# Patient Record
Sex: MALE | Race: OTHER | HISPANIC OR LATINO | URBAN - METROPOLITAN AREA
[De-identification: names, ages, dates, MRNs, and addresses within clinical notes are randomized per-mention and may not be internally consistent; named-entity substitution may affect disease eponyms.]

---

## 2020-02-08 ENCOUNTER — INPATIENT (INPATIENT)
Facility: HOSPITAL | Age: 30
LOS: 1 days | Discharge: HOME | DRG: 176 | End: 2020-02-10
Attending: STUDENT IN AN ORGANIZED HEALTH CARE EDUCATION/TRAINING PROGRAM | Admitting: INTERNAL MEDICINE
Payer: MEDICAID

## 2020-02-08 VITALS
HEIGHT: 65 IN | WEIGHT: 300.05 LBS | DIASTOLIC BLOOD PRESSURE: 63 MMHG | SYSTOLIC BLOOD PRESSURE: 102 MMHG | HEART RATE: 100 BPM | RESPIRATION RATE: 18 BRPM | OXYGEN SATURATION: 97 % | TEMPERATURE: 99 F

## 2020-02-08 DIAGNOSIS — E66.9 OBESITY, UNSPECIFIED: ICD-10-CM

## 2020-02-08 DIAGNOSIS — I26.99 OTHER PULMONARY EMBOLISM WITHOUT ACUTE COR PULMONALE: ICD-10-CM

## 2020-02-08 DIAGNOSIS — R22.1 LOCALIZED SWELLING, MASS AND LUMP, NECK: ICD-10-CM

## 2020-02-08 DIAGNOSIS — R63.8 OTHER SYMPTOMS AND SIGNS CONCERNING FOOD AND FLUID INTAKE: ICD-10-CM

## 2020-02-08 DIAGNOSIS — G47.33 OBSTRUCTIVE SLEEP APNEA (ADULT) (PEDIATRIC): ICD-10-CM

## 2020-02-08 DIAGNOSIS — Z91.89 OTHER SPECIFIED PERSONAL RISK FACTORS, NOT ELSEWHERE CLASSIFIED: ICD-10-CM

## 2020-02-08 DIAGNOSIS — R09.89 OTHER SPECIFIED SYMPTOMS AND SIGNS INVOLVING THE CIRCULATORY AND RESPIRATORY SYSTEMS: ICD-10-CM

## 2020-02-08 LAB
ALBUMIN SERPL ELPH-MCNC: 3.3 G/DL — LOW (ref 3.4–5)
ALP SERPL-CCNC: 63 U/L — SIGNIFICANT CHANGE UP (ref 40–120)
ALT FLD-CCNC: 41 U/L — SIGNIFICANT CHANGE UP (ref 12–42)
ANION GAP SERPL CALC-SCNC: 8 MMOL/L — LOW (ref 9–16)
APPEARANCE UR: CLEAR — SIGNIFICANT CHANGE UP
APTT BLD: 33.3 SEC — SIGNIFICANT CHANGE UP (ref 27.5–36.3)
APTT BLD: >200 SEC — CRITICAL HIGH (ref 27.5–36.3)
AST SERPL-CCNC: 23 U/L — SIGNIFICANT CHANGE UP (ref 15–37)
BILIRUB SERPL-MCNC: 0.3 MG/DL — SIGNIFICANT CHANGE UP (ref 0.2–1.2)
BILIRUB UR-MCNC: NEGATIVE — SIGNIFICANT CHANGE UP
BUN SERPL-MCNC: 12 MG/DL — SIGNIFICANT CHANGE UP (ref 7–23)
CALCIUM SERPL-MCNC: 8.5 MG/DL — SIGNIFICANT CHANGE UP (ref 8.5–10.5)
CHLORIDE SERPL-SCNC: 100 MMOL/L — SIGNIFICANT CHANGE UP (ref 96–108)
CK SERPL-CCNC: 560 U/L — HIGH (ref 39–308)
CO2 SERPL-SCNC: 29 MMOL/L — SIGNIFICANT CHANGE UP (ref 22–31)
COLOR SPEC: YELLOW — SIGNIFICANT CHANGE UP
CREAT SERPL-MCNC: 1.11 MG/DL — SIGNIFICANT CHANGE UP (ref 0.5–1.3)
D DIMER BLD IA.RAPID-MCNC: 669 NG/ML DDU — HIGH
DIFF PNL FLD: NEGATIVE — SIGNIFICANT CHANGE UP
ETHANOL SERPL-MCNC: <3 MG/DL — SIGNIFICANT CHANGE UP
GLUCOSE SERPL-MCNC: 105 MG/DL — HIGH (ref 70–99)
GLUCOSE UR QL: NEGATIVE — SIGNIFICANT CHANGE UP
HCT VFR BLD CALC: 39.5 % — SIGNIFICANT CHANGE UP (ref 39–50)
HGB BLD-MCNC: 13.1 G/DL — SIGNIFICANT CHANGE UP (ref 13–17)
INR BLD: 1.07 — SIGNIFICANT CHANGE UP (ref 0.88–1.16)
KETONES UR-MCNC: NEGATIVE — SIGNIFICANT CHANGE UP
LEUKOCYTE ESTERASE UR-ACNC: NEGATIVE — SIGNIFICANT CHANGE UP
MCHC RBC-ENTMCNC: 28.1 PG — SIGNIFICANT CHANGE UP (ref 27–34)
MCHC RBC-ENTMCNC: 33.2 GM/DL — SIGNIFICANT CHANGE UP (ref 32–36)
MCV RBC AUTO: 84.6 FL — SIGNIFICANT CHANGE UP (ref 80–100)
NITRITE UR-MCNC: NEGATIVE — SIGNIFICANT CHANGE UP
NRBC # BLD: 0 /100 WBCS — SIGNIFICANT CHANGE UP (ref 0–0)
NT-PROBNP SERPL-SCNC: 16 PG/ML — SIGNIFICANT CHANGE UP
NT-PROBNP SERPL-SCNC: 17 PG/ML — SIGNIFICANT CHANGE UP
PCP SPEC-MCNC: SIGNIFICANT CHANGE UP
PH UR: 5.5 — SIGNIFICANT CHANGE UP (ref 5–8)
PLATELET # BLD AUTO: 187 K/UL — SIGNIFICANT CHANGE UP (ref 150–400)
POTASSIUM SERPL-MCNC: 4.3 MMOL/L — SIGNIFICANT CHANGE UP (ref 3.5–5.3)
POTASSIUM SERPL-SCNC: 4.3 MMOL/L — SIGNIFICANT CHANGE UP (ref 3.5–5.3)
PROT SERPL-MCNC: 8.4 G/DL — HIGH (ref 6.4–8.2)
PROT UR-MCNC: NEGATIVE MG/DL — SIGNIFICANT CHANGE UP
PROTHROM AB SERPL-ACNC: 11.9 SEC — SIGNIFICANT CHANGE UP (ref 10–12.9)
RBC # BLD: 4.67 M/UL — SIGNIFICANT CHANGE UP (ref 4.2–5.8)
RBC # FLD: 15.9 % — HIGH (ref 10.3–14.5)
SODIUM SERPL-SCNC: 137 MMOL/L — SIGNIFICANT CHANGE UP (ref 132–145)
SP GR SPEC: 1.02 — SIGNIFICANT CHANGE UP (ref 1–1.03)
TROPONIN I SERPL-MCNC: <0.017 NG/ML — LOW (ref 0.02–0.06)
UROBILINOGEN FLD QL: 0.2 E.U./DL — SIGNIFICANT CHANGE UP
WBC # BLD: 9.02 K/UL — SIGNIFICANT CHANGE UP (ref 3.8–10.5)
WBC # FLD AUTO: 9.02 K/UL — SIGNIFICANT CHANGE UP (ref 3.8–10.5)

## 2020-02-08 PROCEDURE — 99291 CRITICAL CARE FIRST HOUR: CPT

## 2020-02-08 PROCEDURE — 71275 CT ANGIOGRAPHY CHEST: CPT | Mod: 26

## 2020-02-08 PROCEDURE — 71046 X-RAY EXAM CHEST 2 VIEWS: CPT | Mod: 26

## 2020-02-08 RX ORDER — HEPARIN SODIUM 5000 [USP'U]/ML
10000 INJECTION INTRAVENOUS; SUBCUTANEOUS ONCE
Refills: 0 | Status: COMPLETED | OUTPATIENT
Start: 2020-02-08 | End: 2020-02-08

## 2020-02-08 RX ORDER — HEPARIN SODIUM 5000 [USP'U]/ML
INJECTION INTRAVENOUS; SUBCUTANEOUS
Qty: 25000 | Refills: 0 | Status: DISCONTINUED | OUTPATIENT
Start: 2020-02-08 | End: 2020-02-09

## 2020-02-08 RX ORDER — HEPARIN SODIUM 5000 [USP'U]/ML
5000 INJECTION INTRAVENOUS; SUBCUTANEOUS EVERY 6 HOURS
Refills: 0 | Status: DISCONTINUED | OUTPATIENT
Start: 2020-02-08 | End: 2020-02-09

## 2020-02-08 RX ORDER — INFLUENZA VIRUS VACCINE 15; 15; 15; 15 UG/.5ML; UG/.5ML; UG/.5ML; UG/.5ML
0.5 SUSPENSION INTRAMUSCULAR ONCE
Refills: 0 | Status: DISCONTINUED | OUTPATIENT
Start: 2020-02-08 | End: 2020-02-10

## 2020-02-08 RX ORDER — HEPARIN SODIUM 5000 [USP'U]/ML
10000 INJECTION INTRAVENOUS; SUBCUTANEOUS EVERY 6 HOURS
Refills: 0 | Status: DISCONTINUED | OUTPATIENT
Start: 2020-02-08 | End: 2020-02-09

## 2020-02-08 RX ORDER — ASPIRIN/CALCIUM CARB/MAGNESIUM 324 MG
325 TABLET ORAL ONCE
Refills: 0 | Status: COMPLETED | OUTPATIENT
Start: 2020-02-08 | End: 2020-02-08

## 2020-02-08 RX ORDER — SODIUM CHLORIDE 9 MG/ML
1000 INJECTION INTRAMUSCULAR; INTRAVENOUS; SUBCUTANEOUS ONCE
Refills: 0 | Status: COMPLETED | OUTPATIENT
Start: 2020-02-08 | End: 2020-02-08

## 2020-02-08 RX ADMIN — HEPARIN SODIUM 2400 UNIT(S)/HR: 5000 INJECTION INTRAVENOUS; SUBCUTANEOUS at 17:57

## 2020-02-08 RX ADMIN — HEPARIN SODIUM 10000 UNIT(S): 5000 INJECTION INTRAVENOUS; SUBCUTANEOUS at 17:57

## 2020-02-08 RX ADMIN — Medication 325 MILLIGRAM(S): at 15:54

## 2020-02-08 RX ADMIN — SODIUM CHLORIDE 1000 MILLILITER(S): 9 INJECTION INTRAMUSCULAR; INTRAVENOUS; SUBCUTANEOUS at 16:39

## 2020-02-08 NOTE — H&P ADULT - HISTORY OF PRESENT ILLNESS
Patient is a 30yo M with pmh of sleep apnea who presented to [facility] complaining of sharp chest pain of one week duration. Patient states that for the past week he has developed a cramping type of pain in his left upper chest, which radiated to his left neck and down his left arm. He states that for the past week, he has also felt short of breath when walking quickly or walking up the stairs. He denies any lower extremity pain, but reports that about a month ago, his left leg was swollen but the swelling subsided after he moved from sleeping on the couch to sleeping in a bed. Otherwise, patient denies any recent fevers/chills, palpitations, cough, rhinorrhea/nasal congestion, sore throat, abdominal pain. Of note, patient recently stopped smoking two weeks ago. He denies any recent travel, lower extremity trauma, immobilization, or family history of blood clots.    In the ED, T 98.6F, /63, , RR 18 satting at 97% on room air. Labs remarkable for d-dimer of 669. Troponin negative. EKG with NSR. CT angio showing bilateral pulmonary emboli, possible early infarcts in right lower lobe and left upper lobe, and right heart strain. In ED, given 325mg aspirin, 1L NS, 10,000 units heparin and subsequently started on a heparin drip.

## 2020-02-08 NOTE — ED PROVIDER NOTE - CARE PLAN
Principal Discharge DX:	Acute pulmonary embolism, unspecified pulmonary embolism type, unspecified whether acute cor pulmonale present

## 2020-02-08 NOTE — ED PROVIDER NOTE - CRITICAL CARE PROVIDED
interpretation of diagnostic studies/consultation with other physicians/additional history taking/direct patient care (not related to procedure)

## 2020-02-08 NOTE — H&P ADULT - PROBLEM SELECTOR PLAN 1
Patient presenting with sharp chest pain, radiating to left arm and left side of neck, and dyspnea on exertion for one week duration. Found to have b/l pulmonary emboli on CT angio with evidence of right heart strain and possible early infarcts in right lower lobe and left upper lobe  -Patient currently Patient presenting with sharp chest pain, radiating to left arm and left side of neck, and dyspnea on exertion for one week duration. Found to have b/l pulmonary emboli on CT angio with evidence of right heart strain and possible early infarcts in right lower lobe and left upper lobe  -Patient currently clinically stable- VSS, patient appears comfortable on room air, EKG NSR  -Continue heparin gtt  -Echocardiogram tomorrow  -Monitor o2 status  -Patient without any risk factors/ inciting factors (no recent travel, immobilization, trauma, signs of malignancy, family history of blood clots)

## 2020-02-08 NOTE — H&P ADULT - PROBLEM SELECTOR PLAN 3
Patient with reported left sided neck pain for one week duration with left supraclavicular Patient with reported left sided neck pain for one week duration with left supraclavicular swelling, does not appear to be lymphadenopathy  -F/u US of left upper extremity/neck

## 2020-02-08 NOTE — H&P ADULT - ASSESSMENT
Patient is a 30yo M with pmh of sleep apnea who presented to [facility] complaining of sharp chest pain and increasing sob for one week duration, found to have bilateral pulmonary emboli on CT angio.

## 2020-02-08 NOTE — H&P ADULT - NSHPPHYSICALEXAM_GEN_ALL_CORE
.  VITAL SIGNS:  T(C): 36.9 (02-08-20 @ 19:21), Max: 37.7 (02-08-20 @ 15:39)  T(F): 98.5 (02-08-20 @ 19:21), Max: 99.8 (02-08-20 @ 15:39)  HR: 91 (02-08-20 @ 20:12) (88 - 100)  BP: 127/78 (02-08-20 @ 20:12) (102/63 - 151/89)  BP(mean): 98 (02-08-20 @ 20:12) (98 - 112)  RR: 15 (02-08-20 @ 20:12) (14 - 20)  SpO2: 97% (02-08-20 @ 20:12) (94% - 98%)  Wt(kg): --    PHYSICAL EXAM:    Constitutional: Obese male resting comfortably in bed; NAD  Head: NC/AT  Eyes: PERRL, EOMI, anicteric sclera  ENT: no nasal discharge; uvula midline, no oropharyngeal erythema or exudates; MMM  Neck: supple; no JVD or thyromegaly. Left supraclavicular swelling, tender to palpation  Respiratory: CTA B/L; no W/R/R, no retractions  Cardiac: +S1/S2; RRR; no M/R/G; PMI non-displaced  Gastrointestinal: abdomen soft, NT, distended due to body habitus; no rebound or guarding; +BSx4  Back: spine midline, no bony tenderness or step-offs; no CVAT B/L  Extremities: WWP, no clubbing or cyanosis; no peripheral edema  Musculoskeletal: NROM x4; no joint swelling, tenderness or erythema  Vascular: 2+ radial, femoral, DP/PT pulses B/L  Dermatologic: skin warm, dry and intact; no rashes, wounds, or scars  Lymphatic: no submandibular or cervical LAD  Neurologic: AAOx3; CNII-XII grossly intact; no focal deficits  Psychiatric: affect and characteristics of appearance, verbalizations, behaviors are appropriate

## 2020-02-08 NOTE — H&P ADULT - NSHPSOCIALHISTORY_GEN_ALL_CORE
Patient lives at home with wife and child. Not currently working. States he smoked about half a pack of cigarettes per day for about 5 years. Very rare social alcohol use. Smokes about one pound of marijuana daily, no other recreational drug use.

## 2020-02-08 NOTE — CONSULT NOTE ADULT - SUBJECTIVE AND OBJECTIVE BOX
PULMONARY SERVICE INITIAL CONSULT NOTE    HPI:  Patient is a 28yo M with pmh of sleep apnea who presented to [facility] complaining of sharp chest pain of one week duration. Patient states that for the past week he has developed a cramping type of pain in his left upper chest, which radiated to his left neck and down his left arm. He states that for the past week, he has also felt short of breath when walking quickly or walking up the stairs. He denies any lower extremity pain, but reports that about a month ago, his left leg was swollen but the swelling subsided after he moved from sleeping on the couch to sleeping in a bed. Otherwise, patient denies any recent fevers/chills, palpitations, cough, rhinorrhea/nasal congestion, sore throat, abdominal pain. Of note, patient recently stopped smoking two weeks ago. He denies any recent travel, lower extremity trauma, immobilization, or family history of blood clots.    In the ED, T 98.6F, /63, , RR 18 satting at 97% on room air. Labs remarkable for d-dimer of 669. Troponin negative. EKG with NSR. CT angio showing bilateral pulmonary emboli, possible early infarcts in right lower lobe and left upper lobe, and right heart strain. In ED, given 325mg aspirin, 1L NS, 10,000 units heparin and subsequently started on a heparin drip. (2020 21:26)      REVIEW OF SYSTEMS:  Constitutional: No fever, weight loss or fatigue  Eyes: No eye pain, visual disturbances, or discharge  ENMT:  No difficulty hearing, tinnitus, vertigo; No sinus or throat pain  Neck: No pain, stiffness or neck swelling  Respiratory: see HPI  Cardiovascular: No chest pain, palpitations, dizziness or leg swelling  Gastrointestinal: No abdominal or epigastric pain. No nausea, vomiting or hematemesis; No diarrhea or constipation. No melena or hematochezia.  Genitourinary: No dysuria, frequency, hematuria or incontinence  Neurological: No headaches, memory loss, loss of strength, numbness or tremors  Skin: No itching, burning, rashes or lesions   Lymph Nodes: No enlarged glands  Endocrine: No heat or cold intolerance; No hair loss  Musculoskeletal: No joint pain or swelling; No muscle, back or extremity pain  Psychiatric: No depression, anxiety, mood swings or difficulty sleeping  Heme/Lymph: No easy bruising or bleeding gums  Allergy and Immunologic: No hives or eczema    PAST MEDICAL & SURGICAL HISTORY:  Obstructive sleep apnea      FAMILY HISTORY:  FH: type 2 diabetes      SOCIAL HISTORY:  Smoking Status: [X ] Current, [ ] Former, [ ] Never  Pack Years: 15    MEDICATIONS:  Pulmonary:    Antimicrobials:    Anticoagulants:  heparin  Infusion.  Unit(s)/Hr IV Continuous <Continuous>  heparin  Injectable 70174 Unit(s) IV Push every 6 hours PRN  heparin  Injectable 5000 Unit(s) IV Push every 6 hours PRN    Onc:    GI/:    Endocrine:    Cardiac:    Other Medications:  influenza   Vaccine 0.5 milliLiter(s) IntraMuscular once      Allergies    penicillin (Hives)    Intolerances        Vital Signs Last 24 Hrs  T(C): 36.9 (2020 19:21), Max: 37.7 (2020 15:39)  T(F): 98.5 (2020 19:21), Max: 99.8 (2020 15:39)  HR: 91 (2020 20:12) (88 - 100)  BP: 127/78 (2020 20:12) (102/63 - 151/89)  BP(mean): 98 (2020 20:12) (98 - 112)  RR: 15 (2020 20:12) (14 - 20)  SpO2: 97% (2020 20:12) (94% - 98%)        PHYSICAL EXAM:  Constitutional: Obese, not in distress, Drowsy  HEENT: No facial edema, MP 4,  Nontender Prominence In left Supra clavier fossa. Tender cord like swelling left side of neck.   Respiratory: CTA B/L; no W/R/R  Cardiovascular: +S1/S2, RRR  Gastrointestinal: obese Soft, NT/ND; +BSx4  Extremities: WWP; no edema, clubbing or cyanosis  Vascular: 2+ radial, DP/PT pulses B/L  Neurological: AAOx3; no focal deficits    LABS:      CBC Full  -  ( 2020 15:55 )  WBC Count : 9.02 K/uL  RBC Count : 4.67 M/uL  Hemoglobin : 13.1 g/dL  Hematocrit : 39.5 %  Platelet Count - Automated : 187 K/uL  Mean Cell Volume : 84.6 fl  Mean Cell Hemoglobin : 28.1 pg  Mean Cell Hemoglobin Concentration : 33.2 gm/dL  Auto Neutrophil # : x  Auto Lymphocyte # : x  Auto Monocyte # : x  Auto Eosinophil # : x  Auto Basophil # : x  Auto Neutrophil % : x  Auto Lymphocyte % : x  Auto Monocyte % : x  Auto Eosinophil % : x  Auto Basophil % : x        137  |  100  |  12  ----------------------------<  105<H>  4.3   |  29  |  1.11    Ca    8.5      2020 15:55    TPro  8.4<H>  /  Alb  3.3<L>  /  TBili  0.3  /  DBili  x   /  AST  23  /  ALT  41  /  AlkPhos  63  -08    PT/INR - ( 2020 15:55 )   PT: 11.9 sec;   INR: 1.07          PTT - ( 2020 15:55 )  PTT:33.3 sec      Urinalysis Basic - ( 2020 17:13 )    Color: Yellow / Appearance: Clear / S.020 / pH: x  Gluc: x / Ketone: NEGATIVE  / Bili: NEGATIVE / Urobili: 0.2 E.U./dL   Blood: x / Protein: NEGATIVE mg/dL / Nitrite: NEGATIVE   Leuk Esterase: NEGATIVE / RBC: x / WBC x   Sq Epi: x / Non Sq Epi: x / Bacteria: x  < from: CT Angio Chest PE Protocol w/ IV Cont (20 @ 17:10) >  1. Bilateral pulmonary emboli, largest in right lower lobe pulmonary artery.    2. Probable early infarcts right lower lobe and left upper lobe.    3. Right heart strain.     < end of copied text >      RADIOLOGY & ADDITIONAL STUDIES:

## 2020-02-08 NOTE — ED PROVIDER NOTE - OBJECTIVE STATEMENT
30 yo c/o intermittent aching, midsternal chest pain radiating to left neck and left arm x 1 week. Denies any SOB, recent travel, fever, chills, palpitations, GARCIA, SOB, numbness, tingling, paresthesia, HA, dizziness, N/V/D/C, abdominal pain, change in urinary/bowel function, trauma.  Pt states he quit tobacco x 2 weeks ago.

## 2020-02-08 NOTE — CONSULT NOTE ADULT - ASSESSMENT
30yo M with pmh of sleep apnea who presented to [facility] complaining of sharp chest pain of one week duration. CTA showing Bilateral PE.        Pulmonary Embolism  Submassive, unprovoked, no Family of personal hx. No meds. Smoker/ Vaping. No travels ( flights / long drives) / surgery / immobilization.  On Room Air, Troponin and BNP normal. CTA shows bilateral PE with scattered GGO in left upper lobe.   - Agree with Heparin , can transition to oral agent after Echo.   - Get ECHO, DVT studies lower extremity and left upper extremity.   - hypercoagulable work up      Sleep Apnea :  Not complaint with sleep apnea.   BiPAP 15/8 at night time while here.     Smoking   nicotine patch      DIscussed with Dr vitale. 28yo M with pmh of sleep apnea who presented to [facility] complaining of sharp chest pain of one week duration. CTA showing Bilateral PE.        Pulmonary Embolism  Submassive, unprovoked, no Family of personal hx. No meds. Smoker/ Vaping. No travels ( flights / long drives) / surgery / immobilization.  On Room Air, Troponin and BNP normal. CTA shows bilateral PE with scattered GGO in left upper lobe.   - Agree with Heparin , can transition to oral agent after Echo.   - Get ECHO, DVT studies lower extremity and left upper extremity.   - hypercoagulable work up      Sleep Apnea :  CPAP home setting.     Smoking   nicotine patch      Discussed with Dr vitale.

## 2020-02-08 NOTE — ED PROVIDER NOTE - CLINICAL SUMMARY MEDICAL DECISION MAKING FREE TEXT BOX
Pt with intermittent mid sternal chest pain x 1 week  1) EKG/CXR/CT/LABS/aspirin  2) Reassess Pt with intermittent mid sternal chest pain x 1 week  1) EKG/CXR/CT/LABS/aspirin  [facility] BEDS Dr Rosales and Dr Mehta aware, admit 7 [facility], bilateral PE's

## 2020-02-08 NOTE — ED ADULT TRIAGE NOTE - CHIEF COMPLAINT QUOTE
Pt presents to ED c/o intermittent mid sternal chest pain radiating to left neck and left arm x1week. Denies any SOB, no recent travel, quit tobacco x0necqk ago.

## 2020-02-08 NOTE — H&P ADULT - NSHPLABSRESULTS_GEN_ALL_CORE
.  LABS:                         13.1   9.02  )-----------( 187      ( 2020 15:55 )             39.5         137  |  100  |  12  ----------------------------<  105<H>  4.3   |  29  |  1.11    Ca    8.5      2020 15:55    TPro  8.4<H>  /  Alb  3.3<L>  /  TBili  0.3  /  DBili  x   /  AST  23  /  ALT  41  /  AlkPhos  63  -    PT/INR - ( 2020 15:55 )   PT: 11.9 sec;   INR: 1.07          PTT - ( 2020 15:55 )  PTT:33.3 sec  Urinalysis Basic - ( 2020 17:13 )    Color: Yellow / Appearance: Clear / S.020 / pH: x  Gluc: x / Ketone: NEGATIVE  / Bili: NEGATIVE / Urobili: 0.2 E.U./dL   Blood: x / Protein: NEGATIVE mg/dL / Nitrite: NEGATIVE   Leuk Esterase: NEGATIVE / RBC: x / WBC x   Sq Epi: x / Non Sq Epi: x / Bacteria: x      CARDIAC MARKERS ( 2020 15:55 )  <0.017 ng/mL / x     / 560 U/L / x     / x          Serum Pro-Brain Natriuretic Peptide: 17 pg/mL ( @ 17:58)  Serum Pro-Brain Natriuretic Peptide: 16 pg/mL ( @ 17:14)        RADIOLOGY, EKG & ADDITIONAL TESTS: Reviewed.

## 2020-02-08 NOTE — H&P ADULT - PROBLEM SELECTOR PLAN 5
F: None  E: Replete for K<4 and Mag <2  N: Dash/TLC diet  A: Heparin gtt  Dispo: 7La  Code: FULL CODE

## 2020-02-08 NOTE — ED ADULT NURSE NOTE - CHIEF COMPLAINT QUOTE
Pt presents to ED c/o intermittent mid sternal chest pain radiating to left neck and left arm x1week. Denies any SOB, no recent travel, quit tobacco [id number] ago.

## 2020-02-08 NOTE — PATIENT PROFILE ADULT - NSPRESCRUSEDDRG_GEN_A_NUR
Eyes with no visual disturbances.  Ears clean and dry and no hearing difficulties. Nose with pink mucosa and no drainage.  Mouth mucous membranes moist and pink.  No tenderness or swelling to throat or neck.
Yes

## 2020-02-08 NOTE — H&P ADULT - PROBLEM SELECTOR PLAN 2
Patient with diagnosed sleep apnea, states he is supposed to use a machine overnight but has not due to discomfort  -CPAP overnight in hospital  -Will need pulm outpatient follow-up

## 2020-02-09 DIAGNOSIS — R03.0 ELEVATED BLOOD-PRESSURE READING, WITHOUT DIAGNOSIS OF HYPERTENSION: ICD-10-CM

## 2020-02-09 LAB
ANION GAP SERPL CALC-SCNC: 11 MMOL/L — SIGNIFICANT CHANGE UP (ref 5–17)
APTT BLD: 102.5 SEC — HIGH (ref 27.5–36.3)
APTT BLD: 53.1 SEC — HIGH (ref 27.5–36.3)
APTT BLD: 91.5 SEC — HIGH (ref 27.5–36.3)
BLD GP AB SCN SERPL QL: NEGATIVE — SIGNIFICANT CHANGE UP
BUN SERPL-MCNC: 12 MG/DL — SIGNIFICANT CHANGE UP (ref 7–23)
CALCIUM SERPL-MCNC: 8.9 MG/DL — SIGNIFICANT CHANGE UP (ref 8.4–10.5)
CHLORIDE SERPL-SCNC: 101 MMOL/L — SIGNIFICANT CHANGE UP (ref 96–108)
CO2 SERPL-SCNC: 24 MMOL/L — SIGNIFICANT CHANGE UP (ref 22–31)
CREAT SERPL-MCNC: 1 MG/DL — SIGNIFICANT CHANGE UP (ref 0.5–1.3)
GLUCOSE SERPL-MCNC: 106 MG/DL — HIGH (ref 70–99)
HCT VFR BLD CALC: 41.1 % — SIGNIFICANT CHANGE UP (ref 39–50)
HGB BLD-MCNC: 13 G/DL — SIGNIFICANT CHANGE UP (ref 13–17)
MAGNESIUM SERPL-MCNC: 2.2 MG/DL — SIGNIFICANT CHANGE UP (ref 1.6–2.6)
MCHC RBC-ENTMCNC: 27.3 PG — SIGNIFICANT CHANGE UP (ref 27–34)
MCHC RBC-ENTMCNC: 31.6 GM/DL — LOW (ref 32–36)
MCV RBC AUTO: 86.3 FL — SIGNIFICANT CHANGE UP (ref 80–100)
NRBC # BLD: 0 /100 WBCS — SIGNIFICANT CHANGE UP (ref 0–0)
PHOSPHATE SERPL-MCNC: 4.3 MG/DL — SIGNIFICANT CHANGE UP (ref 2.5–4.5)
PLATELET # BLD AUTO: 203 K/UL — SIGNIFICANT CHANGE UP (ref 150–400)
POTASSIUM SERPL-MCNC: 4.2 MMOL/L — SIGNIFICANT CHANGE UP (ref 3.5–5.3)
POTASSIUM SERPL-SCNC: 4.2 MMOL/L — SIGNIFICANT CHANGE UP (ref 3.5–5.3)
RBC # BLD: 4.76 M/UL — SIGNIFICANT CHANGE UP (ref 4.2–5.8)
RBC # FLD: 15.9 % — HIGH (ref 10.3–14.5)
RH IG SCN BLD-IMP: POSITIVE — SIGNIFICANT CHANGE UP
SODIUM SERPL-SCNC: 136 MMOL/L — SIGNIFICANT CHANGE UP (ref 135–145)
WBC # BLD: 8.48 K/UL — SIGNIFICANT CHANGE UP (ref 3.8–10.5)
WBC # FLD AUTO: 8.48 K/UL — SIGNIFICANT CHANGE UP (ref 3.8–10.5)

## 2020-02-09 PROCEDURE — 93971 EXTREMITY STUDY: CPT | Mod: 26,LT

## 2020-02-09 PROCEDURE — 93970 EXTREMITY STUDY: CPT | Mod: 26

## 2020-02-09 PROCEDURE — 76536 US EXAM OF HEAD AND NECK: CPT | Mod: 26

## 2020-02-09 PROCEDURE — 99233 SBSQ HOSP IP/OBS HIGH 50: CPT | Mod: GC

## 2020-02-09 RX ORDER — HYDRALAZINE HCL 50 MG
5 TABLET ORAL ONCE
Refills: 0 | Status: COMPLETED | OUTPATIENT
Start: 2020-02-09 | End: 2020-02-09

## 2020-02-09 RX ORDER — APIXABAN 2.5 MG/1
10 TABLET, FILM COATED ORAL EVERY 12 HOURS
Refills: 0 | Status: DISCONTINUED | OUTPATIENT
Start: 2020-02-09 | End: 2020-02-10

## 2020-02-09 RX ORDER — HEPARIN SODIUM 5000 [USP'U]/ML
2100 INJECTION INTRAVENOUS; SUBCUTANEOUS
Qty: 25000 | Refills: 0 | Status: DISCONTINUED | OUTPATIENT
Start: 2020-02-09 | End: 2020-02-09

## 2020-02-09 RX ORDER — HEPARIN SODIUM 5000 [USP'U]/ML
1800 INJECTION INTRAVENOUS; SUBCUTANEOUS
Qty: 25000 | Refills: 0 | Status: DISCONTINUED | OUTPATIENT
Start: 2020-02-09 | End: 2020-02-09

## 2020-02-09 RX ORDER — ACETAMINOPHEN 500 MG
650 TABLET ORAL ONCE
Refills: 0 | Status: COMPLETED | OUTPATIENT
Start: 2020-02-09 | End: 2020-02-09

## 2020-02-09 RX ADMIN — HEPARIN SODIUM 18 UNIT(S)/HR: 5000 INJECTION INTRAVENOUS; SUBCUTANEOUS at 07:37

## 2020-02-09 RX ADMIN — HEPARIN SODIUM 21 UNIT(S)/HR: 5000 INJECTION INTRAVENOUS; SUBCUTANEOUS at 01:50

## 2020-02-09 RX ADMIN — APIXABAN 10 MILLIGRAM(S): 2.5 TABLET, FILM COATED ORAL at 17:24

## 2020-02-09 NOTE — DIETITIAN INITIAL EVALUATION ADULT. - ENERGY NEEDS
5'5'' 136 pounds +/-10%   (2/8) 300 pounds; %KYS=644.5% BMI 49.9  IBW used to calculate energy needs due to pt's current body weight exceeding 120% of IBW; adjusted for age and BMI - recommend upper end of protein needs

## 2020-02-09 NOTE — PROGRESS NOTE ADULT - PROBLEM SELECTOR PLAN 1
Patient presenting with sharp chest pain, radiating to left arm and left side of neck, and dyspnea on exertion for one week duration. Found to have b/l pulmonary emboli on CT angio with evidence of right heart strain and possible early infarcts in right lower lobe and left upper lobe  -Patient currently clinically stable- VSS, patient appears comfortable on room air, EKG NSR  -Continue heparin gtt  -Echocardiogram pending  -Monitor o2 status  -Patient without inciting factors (no recent travel, immobilization, trauma, signs of malignancy   - Does have a family hx of blood clots (Mom and Uncle)

## 2020-02-09 NOTE — PROGRESS NOTE ADULT - PROBLEM SELECTOR PLAN 5
F: None  E: Replete for K<4 and Mag <2  N: Dash/TLC diet  A: Eliquis  Dispo: RMF awaiting echo and ultrasound; will walk off oxygen tomorrow   Code: FULL CODE Patient with BMI of 49.9  -Nutrition consult

## 2020-02-09 NOTE — DIETITIAN INITIAL EVALUATION ADULT. - OTHER INFO
30yo M with pmh of sleep apnea who presented to [facility] complaining of sharp chest pain of one week duration. CT angio showing bilateral pulmonary emboli, possible early infarcts in right lower lobe and left upper lobe, and right heart strain. Elevated BP without diagnosis of hypertension. No pain per flow sheets. 2+ edema (left arm;  left wrist;  left hand); no pressure ulcers. +BM 2/8. 30yo M with pmh of sleep apnea who presented to [facility] complaining of sharp chest pain of one week duration. CT angio showing bilateral pulmonary emboli, possible early infarcts in right lower lobe and left upper lobe, and right heart strain. Elevated BP without diagnosis of hypertension. ECHO Pending. No pain per flow sheets. 2+ edema (left arm;  left wrist;  left hand); no pressure ulcers. +BM 2/8.  Attempted to see pt various times today, however off unit, RN unsure when pt to return. Pt currently on DASH TLC low fat diet, RN reports eating well however reports eating food from outside hospital; noted with pizza box in pt bed. Pt consulted to be seen for education - pt to benefit from education, RD will follow pt @ [facility] Nutrition [facility] in attempts to obtain HX and provide education on F/U visit. Will follow per protocol.   Please see below for nutritions recommendations.

## 2020-02-09 NOTE — PROGRESS NOTE ADULT - PROBLEM SELECTOR PLAN 3
Patient with reported left sided neck pain for one week duration with left supraclavicular swelling, does not appear to be lymphadenopathy  -F/u US of left upper extremity/neck

## 2020-02-09 NOTE — PROGRESS NOTE ADULT - SUBJECTIVE AND OBJECTIVE BOX
TRANSFER NOTE FROM  to [facility]:     Hospital Course:  Patient is a 30yo M with pmh of sleep apnea who presented to [facility] complaining of sharp chest pain of one week duration. Patient states that for the past week he has developed a cramping type of pain in his left upper chest, which radiated to his left neck and down his left arm. He denies any recent travel, lower extremity trauma, immobilization, but does have family history of mother and uncle. Labs remarkable for d-dimer of 669. Troponin negative. EKG with NSR. CT angio showing bilateral pulmonary emboli, possible early infarcts in right lower lobe and left upper lobe, and right heart strain. In ED, given 325mg aspirin, 1L NS, 10,000 units heparin and subsequently started on a heparin drip. He was seen by Pulmonary who recommended Echo, and switch to Eliquis with follow up outpatient Pulmonary.  He also presented with neck pain, and ordered duplex US of neck, and legs.     INTERVAL HPI/OVERNIGHT EVENTS:  Patient was seen and examined at bedside. He continues to complain of L sided neck pain but has no other complaints. He denies: fever, chills, dizziness, weakness, HA, Changes in vision, CP, palpitations, N/V/D/C, dysuria, changes in bowel movements. ROS otherwise negative.    VITAL SIGNS:  T(F): 97.9 (20 @ 09:31)  HR: 96 (20 @ 09:08)  BP: 144/80 (20 @ 09:08)  RR: 98 (20 @ 09:08)  SpO2: 92% (20 @ 09:08)  Wt(kg): --    PHYSICAL EXAM:    Constitutional: Obese male sitting in chair in NAD  HEENT: PERRL, EOMI, sclera non-icteric, neck supple, trachea midline, no masses, no JVD, MMM, tonsilar hypertrophy w/ erythema  Respiratory: Decreased breath sounds d/t body habitus; no obvious WRR  Cardiovascular: tachycardic, regular rhythm, normal S1S2, no M/R/G  Gastrointestinal: obese, soft, NT, no masses palpable, BS normal  Extremities: Warm, well perfused, pulses equal bilateral upper and lower extremities, no edema, no clubbing. Capillary refill <2 sec  Neurological: AAOx3, CN Grossly intact  Skin: Normal temperature, warm, dry    MEDICATIONS  (STANDING):  apixaban 10 milliGRAM(s) Oral every 12 hours  influenza   Vaccine 0.5 milliLiter(s) IntraMuscular once    MEDICATIONS  (PRN):      Allergies    penicillin (Hives)    Intolerances        LABS:                        13.0   8.48  )-----------( 203      ( 2020 06:18 )             41.1     02-09    136  |  101  |  12  ----------------------------<  106<H>  4.2   |  24  |  1.00    Ca    8.9      2020 06:18  Phos  4.3     02-09  Mg     2.2     02-09    TPro  8.4<H>  /  Alb  3.3<L>  /  TBili  0.3  /  DBili  x   /  AST  23  /  ALT  41  /  AlkPhos  63  02-08    PT/INR - ( 2020 15:55 )   PT: 11.9 sec;   INR: 1.07          PTT - ( 2020 12:24 )  PTT:53.1 sec  Urinalysis Basic - ( 2020 17:13 )    Color: Yellow / Appearance: Clear / S.020 / pH: x  Gluc: x / Ketone: NEGATIVE  / Bili: NEGATIVE / Urobili: 0.2 E.U./dL   Blood: x / Protein: NEGATIVE mg/dL / Nitrite: NEGATIVE   Leuk Esterase: NEGATIVE / RBC: x / WBC x   Sq Epi: x / Non Sq Epi: x / Bacteria: x        RADIOLOGY & ADDITIONAL TESTS:  Reviewed

## 2020-02-09 NOTE — DIETITIAN INITIAL EVALUATION ADULT. - ADD RECOMMEND
1. Monitor PO intake/appetite, GI distress, diet tolerance - s/s of issues chewing/swallowing, Skin, GOC, labs, weights.  2. Honor pt food preferences as able.  3. RD to remain available for additional nutrition interventions as needed.

## 2020-02-09 NOTE — DIETITIAN INITIAL EVALUATION ADULT. - PROBLEM SELECTOR PLAN 1
Patient presenting with sharp chest pain, radiating to left arm and left side of neck, and dyspnea on exertion for one week duration. Found to have b/l pulmonary emboli on CT angio with evidence of right heart strain and possible early infarcts in right lower lobe and left upper lobe  -Patient currently clinically stable- VSS, patient appears comfortable on room air, EKG NSR  -Continue heparin gtt  -Echocardiogram tomorrow  -Monitor o2 status  -Patient without any risk factors/ inciting factors (no recent travel, immobilization, trauma, signs of malignancy, family history of blood clots)

## 2020-02-09 NOTE — PROGRESS NOTE ADULT - ASSESSMENT
Patient is a 30yo M with pmh of sleep apnea who presented to [facility] complaining of sharp chest pain and increasing sob for one week duration, found to have bilateral pulmonary emboli on CT angio.

## 2020-02-09 NOTE — PROGRESS NOTE ADULT - PROBLEM SELECTOR PLAN 4
Patient with BMI of 49.9  -Nutrition consult Patient w/ elevated BP to max 180s but decreased without intervention.   - Continue to monitor  - Can consider addition of amlodipine if BP remains elevated

## 2020-02-09 NOTE — PROGRESS NOTE ADULT - ASSESSMENT
Patient is a 28yo M with pmh of sleep apnea who presented to [facility] complaining of sharp chest pain and increasing sob for one week duration, found to have bilateral pulmonary emboli on CT angio.

## 2020-02-09 NOTE — PROGRESS NOTE ADULT - PROBLEM SELECTOR PLAN 1
Patient presenting with sharp chest pain, radiating to left arm and left side of neck, and dyspnea on exertion for one week duration. Found to have b/l pulmonary emboli on CT angio with evidence of right heart strain and possible early infarcts in right lower lobe and left upper lobe  -Patient currently clinically stable- VSS, patient appears comfortable on 2L NC, EKG NSR  - Patient started on eliquis today; c/w 10mg q12 for 7 days and then decrease to 5mg q12  - Echocardiogram pending  - Monitor o2 status  - Patient without inciting factors (no recent travel, immobilization, trauma, signs of malignancy however does have a family hx of blood clots (Mom and Uncle); will likely benefit from outpatient hematological workup

## 2020-02-09 NOTE — PROGRESS NOTE ADULT - PROBLEM SELECTOR PLAN 2
Patient with diagnosed sleep apnea, states he is supposed to use a machine overnight but has not due to discomfort  - CPAP overnight in hospital  - Will need pulm outpatient follow-up and would likely benefit from tonsillectomy

## 2020-02-09 NOTE — PROGRESS NOTE ADULT - SUBJECTIVE AND OBJECTIVE BOX
OVERNIGHT EVENTS: MATT     SUBJECTIVE / INTERVAL HPI: Patient seen and examined at bedside. Patient is denying any chest pain, or SOB. He was resting comfortably on NC.     VITAL SIGNS:  Vital Signs Last 24 Hrs  T(C): 36.9 (2020 06:19), Max: 37.7 (2020 15:39)  T(F): 98.4 (2020 06:19), Max: 99.8 (2020 15:39)  HR: 88 (2020 06:41) (87 - 103)  BP: 131/60 (2020 05:39) (102/63 - 181/80)  BP(mean): 86 (2020 05:39) (86 - 115)  RR: 19 (2020 06:41) (14 - 20)  SpO2: 91% (2020 06:41) (87% - 99%)    REVIEW OF SYSTEMS:    All other review of systems is negative unless indicated above.    PHYSICAL EXAM:  General: Obese, male in NAD   HEENT: NC/AT; PERRL, clear conjunctiva, prominence in left supra-clavicular fossa   Neck: supple, no JVD,   Cardiovascular: +S1/S2; RRR, no M/R/G  Respiratory: diminished breath sounds at bases b/l; no W/R/R  Gastrointestinal: soft, NT/ND; +BSx4  Extremities: WWP; 2+ peripheral pulses; no edema   Neurological: AAOx3; no focal deficits    MEDICATIONS:  MEDICATIONS  (STANDING):  heparin  Infusion 1800 Unit(s)/Hr (18 mL/Hr) IV Continuous <Continuous>  influenza   Vaccine 0.5 milliLiter(s) IntraMuscular once    MEDICATIONS  (PRN):      ALLERGIES:  Allergies    penicillin (Hives)    Intolerances        LABS:                        13.0   8.48  )-----------( 203      ( 2020 06:18 )             41.1     02-09    136  |  101  |  12  ----------------------------<  106<H>  4.2   |  24  |  1.00    Ca    8.9      2020 06:18  Phos  4.3     02-09  Mg     2.2     02-09    TPro  8.4<H>  /  Alb  3.3<L>  /  TBili  0.3  /  DBili  x   /  AST  23  /  ALT  41  /  AlkPhos  63  02-08    PT/INR - ( 2020 15:55 )   PT: 11.9 sec;   INR: 1.07          PTT - ( 2020 06:18 )  PTT:102.5 sec  Urinalysis Basic - ( 2020 17:13 )    Color: Yellow / Appearance: Clear / S.020 / pH: x  Gluc: x / Ketone: NEGATIVE  / Bili: NEGATIVE / Urobili: 0.2 E.U./dL   Blood: x / Protein: NEGATIVE mg/dL / Nitrite: NEGATIVE   Leuk Esterase: NEGATIVE / RBC: x / WBC x   Sq Epi: x / Non Sq Epi: x / Bacteria: x      CAPILLARY BLOOD GLUCOSE          RADIOLOGY & ADDITIONAL TESTS: Reviewed. TRANSFER NOTE FROM  to [facility]:       OVERNIGHT EVENTS: MATT     SUBJECTIVE / INTERVAL HPI: Patient seen and examined at bedside. Patient is denying any chest pain, or SOB. He was resting comfortably on NC.     VITAL SIGNS:  Vital Signs Last 24 Hrs  T(C): 36.9 (2020 06:19), Max: 37.7 (2020 15:39)  T(F): 98.4 (2020 06:19), Max: 99.8 (2020 15:39)  HR: 88 (2020 06:41) (87 - 103)  BP: 131/60 (2020 05:39) (102/63 - 181/80)  BP(mean): 86 (2020 05:39) (86 - 115)  RR: 19 (2020 06:41) (14 - 20)  SpO2: 91% (2020 06:41) (87% - 99%)    REVIEW OF SYSTEMS:    All other review of systems is negative unless indicated above.    PHYSICAL EXAM:  General: Obese, male in NAD   HEENT: NC/AT; PERRL, clear conjunctiva, prominence in left supra-clavicular fossa   Neck: supple, no JVD,   Cardiovascular: +S1/S2; RRR, no M/R/G  Respiratory: diminished breath sounds at bases b/l; no W/R/R  Gastrointestinal: soft, NT/ND; +BSx4  Extremities: WWP; 2+ peripheral pulses; no edema   Neurological: AAOx3; no focal deficits    MEDICATIONS:  MEDICATIONS  (STANDING):  heparin  Infusion 1800 Unit(s)/Hr (18 mL/Hr) IV Continuous <Continuous>  influenza   Vaccine 0.5 milliLiter(s) IntraMuscular once    MEDICATIONS  (PRN):      ALLERGIES:  Allergies    penicillin (Hives)    Intolerances        LABS:                        13.0   8.48  )-----------( 203      ( 2020 06:18 )             41.1     02-09    136  |  101  |  12  ----------------------------<  106<H>  4.2   |  24  |  1.00    Ca    8.9      2020 06:18  Phos  4.3     02-09  Mg     2.2     02-09    TPro  8.4<H>  /  Alb  3.3<L>  /  TBili  0.3  /  DBili  x   /  AST  23  /  ALT  41  /  AlkPhos  63  02-08    PT/INR - ( 2020 15:55 )   PT: 11.9 sec;   INR: 1.07          PTT - ( 2020 06:18 )  PTT:102.5 sec  Urinalysis Basic - ( 2020 17:13 )    Color: Yellow / Appearance: Clear / S.020 / pH: x  Gluc: x / Ketone: NEGATIVE  / Bili: NEGATIVE / Urobili: 0.2 E.U./dL   Blood: x / Protein: NEGATIVE mg/dL / Nitrite: NEGATIVE   Leuk Esterase: NEGATIVE / RBC: x / WBC x   Sq Epi: x / Non Sq Epi: x / Bacteria: x      CAPILLARY BLOOD GLUCOSE          RADIOLOGY & ADDITIONAL TESTS: Reviewed. TRANSFER NOTE FROM  to [facility]:     Patient is a 28yo M with pmh of sleep apnea who presented to [facility] complaining of sharp chest pain of one week duration. Patient states that for the past week he has developed a cramping type of pain in his left upper chest, which radiated to his left neck and down his left arm. He denies any recent travel, lower extremity trauma, immobilization, but does have family history of mother and uncle. Labs remarkable for d-dimer of 669. Troponin negative. EKG with NSR. CT angio showing bilateral pulmonary emboli, possible early infarcts in right lower lobe and left upper lobe, and right heart strain. In ED, given 325mg aspirin, 1L NS, 10,000 units heparin and subsequently started on a heparin drip. He was seen by Pulmonary who recommended Echo, and switch to Eliquis with follow up outpatient Pulmonary.  He also presented with neck pain, and ordered duplex US of neck, and legs.     OVERNIGHT EVENTS: MATT     SUBJECTIVE / INTERVAL HPI: Patient seen and examined at bedside. Patient is denying any chest pain, or SOB. He was resting comfortably on NC.     VITAL SIGNS:  Vital Signs Last 24 Hrs  T(C): 36.9 (2020 06:19), Max: 37.7 (2020 15:39)  T(F): 98.4 (2020 06:19), Max: 99.8 (2020 15:39)  HR: 88 (2020 06:41) (87 - 103)  BP: 131/60 (2020 05:39) (102/63 - 181/80)  BP(mean): 86 (2020 05:39) (86 - 115)  RR: 19 (2020 06:41) (14 - 20)  SpO2: 91% (2020 06:41) (87% - 99%)    REVIEW OF SYSTEMS:    All other review of systems is negative unless indicated above.    PHYSICAL EXAM:  General: Obese, male in NAD   HEENT: NC/AT; PERRL, clear conjunctiva, prominence in left supra-clavicular fossa   Neck: supple, no JVD,   Cardiovascular: +S1/S2; RRR, no M/R/G  Respiratory: diminished breath sounds at bases b/l; no W/R/R  Gastrointestinal: soft, NT/ND; +BSx4  Extremities: WWP; 2+ peripheral pulses; no edema   Neurological: AAOx3; no focal deficits    MEDICATIONS:  MEDICATIONS  (STANDING):  heparin  Infusion 1800 Unit(s)/Hr (18 mL/Hr) IV Continuous <Continuous>  influenza   Vaccine 0.5 milliLiter(s) IntraMuscular once    MEDICATIONS  (PRN):      ALLERGIES:  Allergies    penicillin (Hives)    Intolerances        LABS:                        13.0   8.48  )-----------( 203      ( 2020 06:18 )             41.1     02-    136  |  101  |  12  ----------------------------<  106<H>  4.2   |  24  |  1.00    Ca    8.9      2020 06:18  Phos  4.3     02-09  Mg     2.2     02-    TPro  8.4<H>  /  Alb  3.3<L>  /  TBili  0.3  /  DBili  x   /  AST  23  /  ALT  41  /  AlkPhos  63  02-08    PT/INR - ( 2020 15:55 )   PT: 11.9 sec;   INR: 1.07          PTT - ( 2020 06:18 )  PTT:102.5 sec  Urinalysis Basic - ( 2020 17:13 )    Color: Yellow / Appearance: Clear / S.020 / pH: x  Gluc: x / Ketone: NEGATIVE  / Bili: NEGATIVE / Urobili: 0.2 E.U./dL   Blood: x / Protein: NEGATIVE mg/dL / Nitrite: NEGATIVE   Leuk Esterase: NEGATIVE / RBC: x / WBC x   Sq Epi: x / Non Sq Epi: x / Bacteria: x      CAPILLARY BLOOD GLUCOSE          RADIOLOGY & ADDITIONAL TESTS: Reviewed.

## 2020-02-09 NOTE — PROGRESS NOTE ADULT - ATTENDING COMMENTS
Patient seen and examined with house-staff during bedside rounds  Resident note read, including vitals, physical findings, laboratory data, and radiological reports.   Revisions included below.  Case discussed with House staff  Direct personal management at bedside  and extensive interpretation of data. Decision making of high complexity.
Pt seen at bedside. Reports breathing is "much better."    Agree with A and P as above    1) acute pulmonary embolism - clinically stable  transitioned to eliquis in 7lach  f/u ECHO  f/u pulm recs    2) elevated BP - on admission yesterday  continue to trend.  If persistently >150/90, would start norvasc.

## 2020-02-09 NOTE — PROGRESS NOTE ADULT - PROBLEM SELECTOR PLAN 6
1) PCP Contacted on Admission: (Y/N) --> Name & Phone #:  2) Date of Contact with PCP: TBD  3) PCP Contacted at Discharge: TBD  4) Summary of Handoff Given to PCP: TBD   5) Post-Discharge Appointment Date: TBD F: None  E: Replete for K<4 and Mag <2  N: Dash/TLC diet  A: Eliquis  Dispo: [facility] awaiting echo and ultrasound; will walk off oxygen tomorrow   Code: FULL CODE

## 2020-02-10 ENCOUNTER — TRANSCRIPTION ENCOUNTER (OUTPATIENT)
Age: 30
End: 2020-02-10

## 2020-02-10 VITALS
SYSTOLIC BLOOD PRESSURE: 123 MMHG | RESPIRATION RATE: 20 BRPM | DIASTOLIC BLOOD PRESSURE: 78 MMHG | OXYGEN SATURATION: 98 % | TEMPERATURE: 98 F | HEART RATE: 102 BPM

## 2020-02-10 PROBLEM — G47.33 OBSTRUCTIVE SLEEP APNEA (ADULT) (PEDIATRIC): Chronic | Status: ACTIVE | Noted: 2020-02-08

## 2020-02-10 LAB
ANION GAP SERPL CALC-SCNC: 12 MMOL/L — SIGNIFICANT CHANGE UP (ref 5–17)
APTT BLD: 36.6 SEC — HIGH (ref 27.5–36.3)
BLD GP AB SCN SERPL QL: NEGATIVE — SIGNIFICANT CHANGE UP
BUN SERPL-MCNC: 12 MG/DL — SIGNIFICANT CHANGE UP (ref 7–23)
CALCIUM SERPL-MCNC: 9 MG/DL — SIGNIFICANT CHANGE UP (ref 8.4–10.5)
CHLORIDE SERPL-SCNC: 102 MMOL/L — SIGNIFICANT CHANGE UP (ref 96–108)
CO2 SERPL-SCNC: 24 MMOL/L — SIGNIFICANT CHANGE UP (ref 22–31)
CREAT SERPL-MCNC: 1.02 MG/DL — SIGNIFICANT CHANGE UP (ref 0.5–1.3)
GLUCOSE SERPL-MCNC: 111 MG/DL — HIGH (ref 70–99)
HCT VFR BLD CALC: 41.2 % — SIGNIFICANT CHANGE UP (ref 39–50)
HGB BLD-MCNC: 13.2 G/DL — SIGNIFICANT CHANGE UP (ref 13–17)
MAGNESIUM SERPL-MCNC: 2.2 MG/DL — SIGNIFICANT CHANGE UP (ref 1.6–2.6)
MCHC RBC-ENTMCNC: 27.6 PG — SIGNIFICANT CHANGE UP (ref 27–34)
MCHC RBC-ENTMCNC: 32 GM/DL — SIGNIFICANT CHANGE UP (ref 32–36)
MCV RBC AUTO: 86.2 FL — SIGNIFICANT CHANGE UP (ref 80–100)
NRBC # BLD: 0 /100 WBCS — SIGNIFICANT CHANGE UP (ref 0–0)
PLATELET # BLD AUTO: 200 K/UL — SIGNIFICANT CHANGE UP (ref 150–400)
POTASSIUM SERPL-MCNC: 4.2 MMOL/L — SIGNIFICANT CHANGE UP (ref 3.5–5.3)
POTASSIUM SERPL-SCNC: 4.2 MMOL/L — SIGNIFICANT CHANGE UP (ref 3.5–5.3)
RBC # BLD: 4.78 M/UL — SIGNIFICANT CHANGE UP (ref 4.2–5.8)
RBC # FLD: 16 % — HIGH (ref 10.3–14.5)
RH IG SCN BLD-IMP: POSITIVE — SIGNIFICANT CHANGE UP
SODIUM SERPL-SCNC: 138 MMOL/L — SIGNIFICANT CHANGE UP (ref 135–145)
WBC # BLD: 8.49 K/UL — SIGNIFICANT CHANGE UP (ref 3.8–10.5)
WBC # FLD AUTO: 8.49 K/UL — SIGNIFICANT CHANGE UP (ref 3.8–10.5)

## 2020-02-10 PROCEDURE — 85610 PROTHROMBIN TIME: CPT

## 2020-02-10 PROCEDURE — 80048 BASIC METABOLIC PNL TOTAL CA: CPT

## 2020-02-10 PROCEDURE — 84484 ASSAY OF TROPONIN QUANT: CPT

## 2020-02-10 PROCEDURE — 84100 ASSAY OF PHOSPHORUS: CPT

## 2020-02-10 PROCEDURE — 96374 THER/PROPH/DIAG INJ IV PUSH: CPT | Mod: XU

## 2020-02-10 PROCEDURE — 85027 COMPLETE CBC AUTOMATED: CPT

## 2020-02-10 PROCEDURE — 93005 ELECTROCARDIOGRAM TRACING: CPT

## 2020-02-10 PROCEDURE — 93306 TTE W/DOPPLER COMPLETE: CPT

## 2020-02-10 PROCEDURE — 93306 TTE W/DOPPLER COMPLETE: CPT | Mod: 26

## 2020-02-10 PROCEDURE — 83735 ASSAY OF MAGNESIUM: CPT

## 2020-02-10 PROCEDURE — 93970 EXTREMITY STUDY: CPT

## 2020-02-10 PROCEDURE — 86901 BLOOD TYPING SEROLOGIC RH(D): CPT

## 2020-02-10 PROCEDURE — 71046 X-RAY EXAM CHEST 2 VIEWS: CPT

## 2020-02-10 PROCEDURE — 86850 RBC ANTIBODY SCREEN: CPT

## 2020-02-10 PROCEDURE — 93971 EXTREMITY STUDY: CPT

## 2020-02-10 PROCEDURE — 71275 CT ANGIOGRAPHY CHEST: CPT

## 2020-02-10 PROCEDURE — 81003 URINALYSIS AUTO W/O SCOPE: CPT

## 2020-02-10 PROCEDURE — 99239 HOSP IP/OBS DSCHRG MGMT >30: CPT | Mod: GC

## 2020-02-10 PROCEDURE — 80307 DRUG TEST PRSMV CHEM ANLYZR: CPT

## 2020-02-10 PROCEDURE — 36415 COLL VENOUS BLD VENIPUNCTURE: CPT

## 2020-02-10 PROCEDURE — 80053 COMPREHEN METABOLIC PANEL: CPT

## 2020-02-10 PROCEDURE — 83880 ASSAY OF NATRIURETIC PEPTIDE: CPT

## 2020-02-10 PROCEDURE — 82550 ASSAY OF CK (CPK): CPT

## 2020-02-10 PROCEDURE — 76536 US EXAM OF HEAD AND NECK: CPT

## 2020-02-10 PROCEDURE — 85730 THROMBOPLASTIN TIME PARTIAL: CPT

## 2020-02-10 PROCEDURE — 94660 CPAP INITIATION&MGMT: CPT

## 2020-02-10 PROCEDURE — 85379 FIBRIN DEGRADATION QUANT: CPT

## 2020-02-10 PROCEDURE — 99285 EMERGENCY DEPT VISIT HI MDM: CPT | Mod: 25

## 2020-02-10 RX ORDER — APIXABAN 2.5 MG/1
1 TABLET, FILM COATED ORAL
Qty: 80 | Refills: 0
Start: 2020-02-10 | End: 2020-03-10

## 2020-02-10 RX ORDER — ACETAMINOPHEN 500 MG
2 TABLET ORAL
Qty: 60 | Refills: 0
Start: 2020-02-10 | End: 2020-02-14

## 2020-02-10 RX ADMIN — APIXABAN 10 MILLIGRAM(S): 2.5 TABLET, FILM COATED ORAL at 06:51

## 2020-02-10 RX ADMIN — Medication 650 MILLIGRAM(S): at 00:11

## 2020-02-10 RX ADMIN — Medication 650 MILLIGRAM(S): at 00:45

## 2020-02-10 NOTE — DISCHARGE NOTE PROVIDER - CARE PROVIDER_API CALL
Sunshine Mehta)  Critical Care Medicine; Pulmonary Disease  [address] [place] [address]  Phone: [phone]  Fax: [phone]  Follow Up Time: 1 month

## 2020-02-10 NOTE — CONSULT NOTE ADULT - ASSESSMENT
29M with PMH of JOE on CPAP admitted to medicine after presenting with chest pain and SOB, found to have bilateral PE. 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 29M with PMH of JOE on CPAP admitted to medicine after presenting with chest pain and SOB, found to have bilateral PE. 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    Plan  No acute vascular intervention indicated  Anticoagulation as per primary team  Rest of care as per primary team  Vascular team will sign off  Discussed with chief resident 29M with PMH of JOE on CPAP admitted to medicine after presenting with chest pain and SOB, found to have bilateral PE. 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    Plan  No acute vascular intervention indicated  Anticoagulation as per primary team  Rest of care as per primary team  Vascular team will sign off  Please have the pt be followed up with Dr. Larsen next week. Call his office to schedule an appointment: [phone].  Discussed with chief resident

## 2020-02-10 NOTE — DISCHARGE NOTE PROVIDER - HOSPITAL COURSE
#Discharge: do not delete        Patient is 28 yo M/F with past medical history of obstructive sleep apnea who presented to [facility] ED with a one week history of worsening chest pain and left upper extremity swelling. The patient additionally complained of worsening shortness of breath. He was found to have bilateral pulmonary emboli on CT angio and possible right heart strain. Ultrasound showed left external jugular and left subclavian vein thrombi. EKG showed normal sinus rhythm and troponins were negative. Pending echocardiogram. The patient's blood pressure transiently increased on his second night, though returned to baseline without intervention. He is clinically stable for discharge and may be further evaluated on an outpatient basis.         Problem 1:    Bilateral Pulmonary Emboli - patient is clinically stable.     Problem 2:    Obstructive Sleep apnea- patient will need pulmonology follow up outpatient and may benefit from tonsillectomy.    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    Problem 4:    Elevated BP- the patient does not require intervention. BP returned to normal after one night.     Problem 5:    Obesity- nutrition consult and encourage active lifestyle.        Inpatient treatment course: While in the ED the patient was given 325mg Aspirin, 1L bolus normal saline, 10,000 Units heparin and subsequently put on a heparin drip. The patient was switched to Eliquis 10mg q12.         New medications: Patient will be discharged with Eliquis 10mg q12 for 5 days (total of 7 days) and will switch to 5mg q12.         Labs to be followed outpatient: None    Exam to be followed outpatient: None #Discharge: do not delete        Patient is 30 yo M/F with past medical history of obstructive sleep apnea who presented to [facility] ED with a one week history of worsening chest pain and left upper extremity swelling. The patient additionally complained of worsening shortness of breath. He was found to have bilateral pulmonary emboli on CT angio and possible right heart strain. Ultrasound showed left external jugular and left subclavian vein thrombi. EKG showed normal sinus rhythm and troponins were negative. Pending echocardiogram. The patient's blood pressure transiently increased on his second night, though returned to baseline without intervention. He is clinically stable for discharge and may be further evaluated on an outpatient basis.         Problem 1:    Bilateral Pulmonary Emboli     - patient is clinically stable             Problem 2:    Obstructive Sleep apnea- patient will need pulmonology follow up outpatient and may benefit from tonsillectomy.    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    Problem 4:    Elevated BP- the patient does not require intervention. BP returned to normal after one night.     Problem 5:    Obesity- nutrition consult and encourage active lifestyle.        Inpatient treatment course: While in the ED the patient was given 325mg Aspirin, 1L bolus normal saline, 10,000 Units heparin and subsequently put on a heparin drip. The patient was switched to Eliquis 10mg q12.         New medications: Patient will be discharged with Eliquis 10mg q12 for 5 days (total of 7 days) and will switch to 5mg q12.         Labs to be followed outpatient: None    Exam to be followed outpatient: None #Discharge: do not delete        Patient is 28 yo M/F with past medical history of obstructive sleep apnea who presented to [facility] ED with a one week history of worsening chest pain and left upper extremity swelling. The patient additionally complained of worsening shortness of breath. He was found to have bilateral pulmonary emboli on CT angio and possible right heart strain. Ultrasound showed left external jugular and left subclavian vein thrombi. EKG showed normal sinus rhythm and troponins were negative. Echocardiogram showed no evidence of right heart strain. The patient's blood pressure transiently increased on his second night, though returned to baseline without intervention. He is clinically stable for discharge and may be further evaluated on an outpatient basis.         Problem 1:    Bilateral Pulmonary Emboli     - CT PE protocol with bilateral pulmonary emboli, largest in right lower lobe pulmonary artery with probable early infarcts right lower lobe and left upper lobe.    - initially started on heparin drip and then transitioned to eliquis    - patient clinically stable. Ambulating O2 sats 92-97% on RA.         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        Problem 4:    Elevated BP     - the patient does not require intervention. BP returned to normal after one night.         Problem 5:    Obesity- nutrition consult and encourage active lifestyle.        Inpatient treatment course: While in the ED the patient was given 325mg Aspirin, 1L bolus normal saline, 10,000 Units heparin and subsequently put on a heparin drip. The patient was switched to Eliquis 10mg q12.         New medications: Patient will be discharged with Eliquis 10mg q12 for 5 days (total of 7 days) and will switch to 5mg q12.         Labs to be followed outpatient: None    Exam to be followed outpatient: None Patient is 28 yo M/F with past medical history of obstructive sleep apnea who presented to [facility] ED with a one week history of worsening chest pain and left upper extremity swelling. The patient additionally complained of worsening shortness of breath. He was found to have bilateral pulmonary emboli on CT angio and possible right heart strain. Ultrasound showed left external jugular and left subclavian vein thrombi. EKG showed normal sinus rhythm and troponins were negative. Echocardiogram showed no evidence of right heart strain. The patient's blood pressure transiently increased on his second night, though returned to baseline without intervention. He is clinically stable for discharge and may be further evaluated on an outpatient basis.         Problem 1:    Bilateral Pulmonary Emboli     - CT PE protocol with bilateral pulmonary emboli, largest in right lower lobe pulmonary artery with probable early infarcts right lower lobe and left upper lobe.    - initially started on heparin drip and then transitioned to eliquis    - patient clinically stable. Ambulating O2 sats 92-97% on RA.         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        Problem 4:    Elevated BP     - the patient does not require intervention. BP returned to normal after one night.         Problem 5:    Obesity- nutrition consult and encourage active lifestyle.        Inpatient treatment course: While in the ED the patient was given 325mg Aspirin, 1L bolus normal saline, 10,000 Units heparin and subsequently put on a heparin drip. The patient was switched to Eliquis 10mg q12.         New medications: Patient will be discharged with Eliquis 10mg q12 for 5 days (total of 7 days) and will switch to 5mg q12.         Labs to be followed outpatient: None    Exam to be followed outpatient: None

## 2020-02-10 NOTE — DISCHARGE NOTE PROVIDER - NSDCFUADDAPPT_GEN_ALL_CORE_FT
Please follow up with Dr. Mehta, pulmonologist in 6 weeks. Please call his office to make an appointment.    Please follow up at [facility] in 2 weeks to establish care and for follow up. Please follow up with Dr. Mehta, pulmonologist in 6 weeks. Please call his office to make an appointment.    Please follow up at [facility] in 2 weeks to establish care and for follow up. Your appointment is scheduled f on 2/20/2020 at 4PM.

## 2020-02-10 NOTE — DISCHARGE NOTE PROVIDER - NSDCMRMEDTOKEN_GEN_ALL_CORE_FT
Eliquis 5 mg oral tablet: Take 2 tabs in evening on 2/10/2020   Take 2 tabs twice a day from 2/11/2020 to 2/15/2020  Take 1 tab twice a day from 2/16/2020  Tylenol 325 mg oral tablet: 2 tab(s) orally every 4 hours  as needed for pain MDD:4000 mg

## 2020-02-10 NOTE — DISCHARGE NOTE NURSING/CASE MANAGEMENT/SOCIAL WORK - NSDCFUADDAPPT_GEN_ALL_CORE_FT
Please follow up with Dr. Mehta, pulmonologist in 6 weeks. Please call his office to make an appointment.    Please follow up at [facility] in 2 weeks to establish care and for follow up. Your appointment is scheduled f on 2/20/2020 at 4PM.

## 2020-02-10 NOTE — CONSULT NOTE ADULT - SUBJECTIVE AND OBJECTIVE BOX
29M with PMH of JOE on CPAP admitted to medicine after presenting with chest pain and SOB, found to have bilateral PE. 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 Pt complains of sharp chest pain and SOB for 1 week. SOBOE after walking quickly or going up the stairs. Denies fever, chills, palpitations. Denies recent travel, LE pain, immobilisation or FH of blood clots. Pt also complained of left neck pain, US showed thrombus in the left EJ and subclavian vein. Pt denies left upper extremity pain.         PAST MEDICAL & SURGICAL HISTORY:  Obstructive sleep apnea      MEDICATIONS  (STANDING):  apixaban 10 milliGRAM(s) Oral every 12 hours  influenza   Vaccine 0.5 milliLiter(s) IntraMuscular once    MEDICATIONS  (PRN):      Allergies    penicillin (Hives)    Intolerances        FAMILY HISTORY:  FH: type 2 diabetes      ROS: otherwise negative.    Vital Signs Last 24 Hrs  T(C): 36.8 (10 Feb 2020 10:10), Max: 36.9 (2020 18:17)  T(F): 98.2 (10 Feb 2020 10:10), Max: 98.4 (2020 18:17)  HR: 102 (10 Feb 2020 10:10) (95 - 108)  BP: 123/78 (10 Feb 2020 10:10) (119/71 - 144/92)  BP(mean): 89 (2020 13:35) (89 - 89)  RR: 20 (10 Feb 2020 10:10) (16 - 20)  SpO2: 98% (10 Feb 2020 10:10) (95% - 99%)    I&O's Summary    2020 07:01  -  10 Feb 2020 07:00  --------------------------------------------------------  IN: 0 mL / OUT: 900 mL / NET: -900 mL        Physical Exam:  General: Generous body habitus. NAD, resting comfortably in bed  HEENT: MMM  Pulmonary: nonlabored breathing, normal resp effort  Cardiovascular: RRR  Abdominal: soft, nontender, nondistended  Extremities: LUE swollen compared to right. Tender to palpation in the arm and forearm. Radial and ulnar 2+ b/l. Sensation intact b/l UE.   Neuro: no focal deficits  Psych: pleasant    LABS:                        13.2   8.49  )-----------( 200      ( 10 Feb 2020 07:01 )             41.2     02-10    138  |  102  |  12  ----------------------------<  111<H>  4.2   |  24  |  1.02    Ca    9.0      10 Feb 2020 07:01  Phos  4.3     02-09  Mg     2.2     02-10    TPro  8.4<H>  /  Alb  3.3<L>  /  TBili  0.3  /  DBili  x   /  AST  23  /  ALT  41  /  AlkPhos  63  02-08    PT/INR - ( 2020 15:55 )   PT: 11.9 sec;   INR: 1.07          PTT - ( 10 Feb 2020 07:01 )  PTT:36.6 sec  Urinalysis Basic - ( 2020 17:13 )    Color: Yellow / Appearance: Clear / S.020 / pH: x  Gluc: x / Ketone: NEGATIVE  / Bili: NEGATIVE / Urobili: 0.2 E.U./dL   Blood: x / Protein: NEGATIVE mg/dL / Nitrite: NEGATIVE   Leuk Esterase: NEGATIVE / RBC: x / WBC x   Sq Epi: x / Non Sq Epi: x / Bacteria: x      CAPILLARY BLOOD GLUCOSE        LIVER FUNCTIONS - ( 2020 15:55 )  Alb: 3.3 g/dL / Pro: 8.4 g/dL / ALK PHOS: 63 U/L / ALT: 41 U/L / AST: 23 U/L / GGT: x             Cultures:      RADIOLOGY & ADDITIONAL STUDIES:  < from: CT Angio Chest PE Protocol w/ IV Cont (20 @ 17:10) >    IMPRESSION:   1. Bilateral pulmonary emboli, largest in right lower lobe pulmonary artery.    2. Probable early infarcts right lower lobe and left upper lobe.    3. Right heart strain.     < end of copied text >  < from: US Duplex Venous Upper Ext Ltd, Left (20 @ 15:13) >      IMPRESSION:   There is thrombus in the left external jugular vein and left subclavian vein.    Findings were discussed with Dr. Bernard at 3:45 PM on 2020.    < end of copied text > 29M with PMH of JOE on CPAP admitted to medicine after presenting with chest pain and SOB, found to have bilateral PE. 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 Pt complains of sharp chest pain and SOB for 1 week. SOBOE after walking quickly or going up the stairs. Denies fever, chills, palpitations. Denies recent travel, LE pain, immobilisation or FH of blood clots. Pt also complained of left neck pain, US showed thrombus in the left EJ and subclavian vein. Pt denies left upper extremity pain.         PAST MEDICAL & SURGICAL HISTORY:  Obstructive sleep apnea      MEDICATIONS  (STANDING):  apixaban 10 milliGRAM(s) Oral every 12 hours  influenza   Vaccine 0.5 milliLiter(s) IntraMuscular once    MEDICATIONS  (PRN):      Allergies    penicillin (Hives)    Intolerances      Patient lives at home with wife and child. Not currently working. States he smoked about half a pack of cigarettes per day for about 5 years. Very rare social alcohol use. Smokes about one pound of marijuana daily, no other recreational drug use. Stopped smoking 2 weeks ago    FAMILY HISTORY:  FH: type 2 diabetes      ROS: otherwise negative.    Vital Signs Last 24 Hrs  T(C): 36.8 (10 Feb 2020 10:10), Max: 36.9 (2020 18:17)  T(F): 98.2 (10 Feb 2020 10:10), Max: 98.4 (2020 18:17)  HR: 102 (10 Feb 2020 10:10) (95 - 108)  BP: 123/78 (10 Feb 2020 10:10) (119/71 - 144/92)  BP(mean): 89 (2020 13:35) (89 - 89)  RR: 20 (10 Feb 2020 10:10) (16 - 20)  SpO2: 98% (10 Feb 2020 10:10) (95% - 99%)    I&O's Summary    2020 07:01  -  10 Feb 2020 07:00  --------------------------------------------------------  IN: 0 mL / OUT: 900 mL / NET: -900 mL        Physical Exam:  General: Generous body habitus. NAD, resting comfortably in bed  HEENT: MMM  Pulmonary: nonlabored breathing, normal resp effort  Cardiovascular: RRR  Abdominal: soft, nontender, nondistended  Extremities: LUE swollen compared to right. Tender to palpation in the arm and forearm. Radial and ulnar 2+ b/l. Sensation intact b/l UE.   Neuro: no focal deficits  Psych: pleasant    LABS:                        13.2   8.49  )-----------( 200      ( 10 Feb 2020 07:01 )             41.2     02-10    138  |  102  |  12  ----------------------------<  111<H>  4.2   |  24  |  1.02    Ca    9.0      10 Feb 2020 07:01  Phos  4.3     02-09  Mg     2.2     02-10    TPro  8.4<H>  /  Alb  3.3<L>  /  TBili  0.3  /  DBili  x   /  AST  23  /  ALT  41  /  AlkPhos  63  02-08    PT/INR - ( 2020 15:55 )   PT: 11.9 sec;   INR: 1.07          PTT - ( 10 Feb 2020 07:01 )  PTT:36.6 sec  Urinalysis Basic - ( 2020 17:13 )    Color: Yellow / Appearance: Clear / S.020 / pH: x  Gluc: x / Ketone: NEGATIVE  / Bili: NEGATIVE / Urobili: 0.2 E.U./dL   Blood: x / Protein: NEGATIVE mg/dL / Nitrite: NEGATIVE   Leuk Esterase: NEGATIVE / RBC: x / WBC x   Sq Epi: x / Non Sq Epi: x / Bacteria: x      CAPILLARY BLOOD GLUCOSE        LIVER FUNCTIONS - ( 2020 15:55 )  Alb: 3.3 g/dL / Pro: 8.4 g/dL / ALK PHOS: 63 U/L / ALT: 41 U/L / AST: 23 U/L / GGT: x             Cultures:      RADIOLOGY & ADDITIONAL STUDIES:  < from: CT Angio Chest PE Protocol w/ IV Cont (20 @ 17:10) >    IMPRESSION:   1. Bilateral pulmonary emboli, largest in right lower lobe pulmonary artery.    2. Probable early infarcts right lower lobe and left upper lobe.    3. Right heart strain.     < end of copied text >  < from: US Duplex Venous Upper Ext Ltd, Left (20 @ 15:13) >      IMPRESSION:   There is thrombus in the left external jugular vein and left subclavian vein.    Findings were discussed with Dr. Bernard at 3:45 PM on 2020.    < end of copied text >

## 2020-02-10 NOTE — DISCHARGE NOTE PROVIDER - NSDCCPCAREPLAN_GEN_ALL_CORE_FT
PRINCIPAL DISCHARGE DIAGNOSIS  Diagnosis: Acute pulmonary embolism, unspecified pulmonary embolism type, unspecified whether acute cor pulmonale present  Assessment and Plan of Treatment: You were found to have a blood clot in your lungs. This occurs when a clot from a distal part of your body dislodges and ends up in your lungs. In your case, it is likely that the clot in your lung originated from a vein in your neck. Clots can form from prolonged immobolization, if you have a genetic predisopition or have PRINCIPAL DISCHARGE DIAGNOSIS  Diagnosis: Acute pulmonary embolism, unspecified pulmonary embolism type, unspecified whether acute cor pulmonale present  Assessment and Plan of Treatment: You were found to have a blood clot in your lungs. This occurs when a clot from a distal part of your body dislodges and ends up in your lungs. This can cause pain and difficulty breathing. In your case, it is likely that the clot in your lung originated from a vein in your neck, dislodged and was in your lungs. Clots can form from prolonged immobolization, if you have a genetic predisopition/family history. You were started on anticoagulation. It is important to take eliquis (blood thinner) as prescribed and follow up with [facility] in 2 weeks and with Dr. Mehta, pulmonogy in 6 weeks. Please seek medical attention if you have any changes in your mental status, uncontrolled bleeding, worsening of any of your symptoms including difficulty breathing, chest pain, increased swelling in your arm. Please also stop smoking cigarettes/vapes as this increases your risk for blood clots.      SECONDARY DISCHARGE DIAGNOSES  Diagnosis: Obstructive sleep apnea  Assessment and Plan of Treatment: You have a history of sleep apnea. It is important to use your cpap machine at night time while you sleep. There are periods throughout the night where you are likely not breathing. This can lead to complications such as hypertension, daytime tiredeness and fatigue. Please use your cpap machine at nighttime.

## 2020-02-10 NOTE — DISCHARGE NOTE NURSING/CASE MANAGEMENT/SOCIAL WORK - PATIENT PORTAL LINK FT
You can access the FollowMyHealth Patient Portal offered by [facility] by registering at the following website: [email or web address]. By joining [facility]’s FollowMyHealth portal, you will also be able to view your health information using other applications (apps) compatible with our system.

## 2020-02-10 NOTE — DISCHARGE NOTE NURSING/CASE MANAGEMENT/SOCIAL WORK - NSDCPEELIQUISFU_GEN_ALL_CORE
Patient admitted with sepsis
Pt with UTI and hyponatremia.
Go for blood tests as directed. Your doctor will do lab tests at regular visits to monitor the effects of this medicine. Please follow up with your doctor and keep your health care provider appointments.

## 2020-02-10 NOTE — DISCHARGE NOTE NURSING/CASE MANAGEMENT/SOCIAL WORK - NSDCPEWEB_GEN_ALL_CORE
[facility] for Tobacco Control website --- [email or web address] website --- [email or web address]

## 2020-02-10 NOTE — DISCHARGE NOTE PROVIDER - NSFOLLOWUPCLINICS_GEN_ALL_ED_FT
[facility]  Family Medicine  [address]  [place], [place] [address]  Phone: [phone]  Fax:   Follow Up Time: 2 weeks [facility]  Family Medicine  [address]  [place], [place] [address]  Phone: [phone]  Fax:   Follow Up Time: 2 weeks

## 2020-02-10 NOTE — SBIRT NOTE ADULT - NSSBIRTDRGBRIEFINTDET_GEN_A_CORE
Patient reports smoking an 8th of marijuana daily. Patient stated he stopped smoking 2 months ago. SW offered resources, patient declined.

## 2020-02-10 NOTE — DISCHARGE NOTE NURSING/CASE MANAGEMENT/SOCIAL WORK - NSDCPEEMAIL_GEN_ALL_CORE
[facility] for Tobacco Control email [email or web address]

## 2020-02-14 DIAGNOSIS — F17.200 NICOTINE DEPENDENCE, UNSPECIFIED, UNCOMPLICATED: ICD-10-CM

## 2020-02-14 DIAGNOSIS — I82.622 ACUTE EMBOLISM AND THROMBOSIS OF DEEP VEINS OF LEFT UPPER EXTREMITY: ICD-10-CM

## 2020-02-14 DIAGNOSIS — I82.B12 ACUTE EMBOLISM AND THROMBOSIS OF LEFT SUBCLAVIAN VEIN: ICD-10-CM

## 2020-02-14 DIAGNOSIS — Z88.0 ALLERGY STATUS TO PENICILLIN: ICD-10-CM

## 2020-02-14 DIAGNOSIS — R03.0 ELEVATED BLOOD-PRESSURE READING, WITHOUT DIAGNOSIS OF HYPERTENSION: ICD-10-CM

## 2020-02-14 DIAGNOSIS — G47.33 OBSTRUCTIVE SLEEP APNEA (ADULT) (PEDIATRIC): ICD-10-CM

## 2020-02-14 DIAGNOSIS — I26.99 OTHER PULMONARY EMBOLISM WITHOUT ACUTE COR PULMONALE: ICD-10-CM

## 2020-02-14 DIAGNOSIS — F12.99 CANNABIS USE, UNSPECIFIED WITH UNSPECIFIED CANNABIS-INDUCED DISORDER: ICD-10-CM

## 2020-02-14 DIAGNOSIS — E66.01 MORBID (SEVERE) OBESITY DUE TO EXCESS CALORIES: ICD-10-CM

## 2020-02-19 PROBLEM — Z00.00 ENCOUNTER FOR PREVENTIVE HEALTH EXAMINATION: Status: ACTIVE | Noted: 2020-02-19

## 2020-02-20 ENCOUNTER — APPOINTMENT (OUTPATIENT)
Dept: INTERNAL MEDICINE | Facility: CLINIC | Age: 30
End: 2020-02-20

## 2022-04-06 NOTE — PROGRESS NOTE ADULT - PROBLEM SELECTOR PROBLEM 6
Transition of care performed with sharing of clinical summary Nutrition, metabolism, and development symptoms Complex Repair And W Plasty Text: The defect edges were debeveled with a #15 scalpel blade.  The primary defect was closed partially with a complex linear closure.  Given the location of the remaining defect, shape of the defect and the proximity to free margins a W plasty was deemed most appropriate for complete closure of the defect.  Using a sterile surgical marker, an appropriate advancement flap was drawn incorporating the defect and placing the expected incisions within the relaxed skin tension lines where possible.    The area thus outlined was incised deep to adipose tissue with a #15 scalpel blade.  The skin margins were undermined to an appropriate distance in all directions utilizing iris scissors.
